# Patient Record
Sex: FEMALE | Race: ASIAN | NOT HISPANIC OR LATINO | URBAN - METROPOLITAN AREA
[De-identification: names, ages, dates, MRNs, and addresses within clinical notes are randomized per-mention and may not be internally consistent; named-entity substitution may affect disease eponyms.]

---

## 2020-06-02 ENCOUNTER — EMERGENCY (EMERGENCY)
Facility: HOSPITAL | Age: 75
LOS: 1 days | Discharge: HOME | End: 2020-06-02
Attending: EMERGENCY MEDICINE | Admitting: EMERGENCY MEDICINE
Payer: MEDICARE

## 2020-06-02 VITALS
OXYGEN SATURATION: 97 % | DIASTOLIC BLOOD PRESSURE: 80 MMHG | HEART RATE: 85 BPM | WEIGHT: 104.72 LBS | TEMPERATURE: 98 F | RESPIRATION RATE: 18 BRPM | SYSTOLIC BLOOD PRESSURE: 130 MMHG

## 2020-06-02 VITALS
HEART RATE: 85 BPM | DIASTOLIC BLOOD PRESSURE: 71 MMHG | SYSTOLIC BLOOD PRESSURE: 125 MMHG | RESPIRATION RATE: 17 BRPM | OXYGEN SATURATION: 97 % | TEMPERATURE: 98 F

## 2020-06-02 LAB
APPEARANCE UR: ABNORMAL
BILIRUB UR-MCNC: ABNORMAL
COD CRY URNS QL: ABNORMAL
COLOR SPEC: YELLOW — SIGNIFICANT CHANGE UP
COMMENT - URINE: SIGNIFICANT CHANGE UP
COMMENT - URINE: SIGNIFICANT CHANGE UP
DIFF PNL FLD: ABNORMAL
GLUCOSE UR QL: 50 MG/DL
KETONES UR-MCNC: ABNORMAL
LEUKOCYTE ESTERASE UR-ACNC: ABNORMAL
NITRITE UR-MCNC: NEGATIVE — SIGNIFICANT CHANGE UP
PH UR: 5 — SIGNIFICANT CHANGE UP (ref 5–8)
PROT UR-MCNC: 30 MG/DL
RBC CASTS # UR COMP ASSIST: SIGNIFICANT CHANGE UP /HPF (ref 0–4)
SP GR SPEC: 1.02 — SIGNIFICANT CHANGE UP (ref 1.01–1.02)
UROBILINOGEN FLD QL: 4

## 2020-06-02 PROCEDURE — 82962 GLUCOSE BLOOD TEST: CPT

## 2020-06-02 PROCEDURE — 81001 URINALYSIS AUTO W/SCOPE: CPT

## 2020-06-02 PROCEDURE — 87086 URINE CULTURE/COLONY COUNT: CPT

## 2020-06-02 PROCEDURE — 99283 EMERGENCY DEPT VISIT LOW MDM: CPT

## 2020-06-02 RX ORDER — CEFUROXIME AXETIL 250 MG
500 TABLET ORAL ONCE
Refills: 0 | Status: COMPLETED | OUTPATIENT
Start: 2020-06-02 | End: 2020-06-02

## 2020-06-02 RX ORDER — CEFUROXIME AXETIL 250 MG
1 TABLET ORAL
Qty: 14 | Refills: 0
Start: 2020-06-02 | End: 2020-06-08

## 2020-06-02 RX ADMIN — Medication 500 MILLIGRAM(S): at 12:56

## 2020-06-02 NOTE — ED ADULT NURSE NOTE - TOBACCO USE
Pt c/o feeling unbalanced since 2005 & losing weight. Pt c/o n/v/ with abd pain. Pt unsteady on feet. Never smoker

## 2020-06-02 NOTE — ED ADULT NURSE NOTE - CAS ELECT INFOMATION PROVIDED
DC instructions D/C given to pt w daughter on the phone to interpret the instructions.  Pt did not speak english/DC instructions/Other

## 2020-06-02 NOTE — ED PROVIDER NOTE - PROGRESS NOTE DETAILS
u/a + blood dehydrated advised fluids will cover with abx and f/u with urologist stable for d/c dw daughter as well

## 2020-06-02 NOTE — ED PROVIDER NOTE - OBJECTIVE STATEMENT
Pt is a 74 yo female Wolof speaking here for frequent urination for 2 weeks denies any pain dysuria fever chills abdominal pain back pain chest pain sob. Pt denies any covid contacts or covid symptoms.

## 2020-06-02 NOTE — ED PROVIDER NOTE - PATIENT PORTAL LINK FT
You can access the FollowMyHealth Patient Portal offered by [facility] by registering at the following website: [email or web address]. By joining [employer]’s FollowMyHealth portal, you will also be able to view your health information using other applications (apps) compatible with our system.

## 2020-06-02 NOTE — ED PROVIDER NOTE - CARE PROVIDER_API CALL
Dennis Valles  UROLOGY  [address] [place] [address]  Phone: [phone]  Fax: [phone]  Follow Up Time:

## 2020-06-02 NOTE — ED PROVIDER NOTE - ATTENDING CONTRIBUTION TO CARE
I have personally performed a face to face diagnostic evaluation on this patient.  I have reviewed the PA note and agree with the history, exam, and plan of care, except as noted.  History and Exam by me shows  urinary frequency for 2 weeks c no other complaint.  A n O x 3 .  abd- soft, nt, no guarding, rebound.  us shows mod ketone and blood and Caoxalate crystals.

## 2020-06-03 LAB
CULTURE RESULTS: SIGNIFICANT CHANGE UP
SPECIMEN SOURCE: SIGNIFICANT CHANGE UP

## 2020-06-29 ENCOUNTER — EMERGENCY (EMERGENCY)
Facility: HOSPITAL | Age: 75
LOS: 1 days | Discharge: HOME | End: 2020-06-29
Attending: EMERGENCY MEDICINE | Admitting: EMERGENCY MEDICINE
Payer: MEDICARE

## 2020-06-29 VITALS
SYSTOLIC BLOOD PRESSURE: 99 MMHG | DIASTOLIC BLOOD PRESSURE: 57 MMHG | HEART RATE: 67 BPM | RESPIRATION RATE: 16 BRPM | OXYGEN SATURATION: 97 % | TEMPERATURE: 98 F

## 2020-06-29 VITALS
RESPIRATION RATE: 16 BRPM | OXYGEN SATURATION: 96 % | DIASTOLIC BLOOD PRESSURE: 75 MMHG | SYSTOLIC BLOOD PRESSURE: 131 MMHG | HEART RATE: 82 BPM | TEMPERATURE: 99 F | WEIGHT: 110.01 LBS | HEIGHT: 63.78 IN

## 2020-06-29 DIAGNOSIS — F32.89 OTHER SPECIFIED DEPRESSIVE EPISODES: ICD-10-CM

## 2020-06-29 DIAGNOSIS — F41.9 ANXIETY DISORDER, UNSPECIFIED: ICD-10-CM

## 2020-06-29 PROBLEM — Z78.9 OTHER SPECIFIED HEALTH STATUS: Chronic | Status: ACTIVE | Noted: 2020-06-02

## 2020-06-29 LAB
ALBUMIN SERPL ELPH-MCNC: 3.7 G/DL — SIGNIFICANT CHANGE UP (ref 3.3–5)
ALP SERPL-CCNC: 49 U/L — SIGNIFICANT CHANGE UP (ref 40–120)
ALT FLD-CCNC: 38 U/L — SIGNIFICANT CHANGE UP (ref 12–78)
ANION GAP SERPL CALC-SCNC: 3 MMOL/L — LOW (ref 5–17)
APAP SERPL-MCNC: <2 UG/ML — LOW (ref 10–30)
APPEARANCE UR: CLEAR — SIGNIFICANT CHANGE UP
APTT BLD: 30.5 SEC — SIGNIFICANT CHANGE UP (ref 28.5–37)
AST SERPL-CCNC: 36 U/L — SIGNIFICANT CHANGE UP (ref 15–37)
BASOPHILS # BLD AUTO: 0.01 K/UL — SIGNIFICANT CHANGE UP (ref 0–0.2)
BASOPHILS NFR BLD AUTO: 0.3 % — SIGNIFICANT CHANGE UP (ref 0–2)
BILIRUB SERPL-MCNC: 1.3 MG/DL — HIGH (ref 0.2–1.2)
BILIRUB UR-MCNC: ABNORMAL
BUN SERPL-MCNC: 12 MG/DL — SIGNIFICANT CHANGE UP (ref 7–23)
CALCIUM SERPL-MCNC: 8.3 MG/DL — LOW (ref 8.5–10.1)
CHLORIDE SERPL-SCNC: 106 MMOL/L — SIGNIFICANT CHANGE UP (ref 96–108)
CO2 SERPL-SCNC: 29 MMOL/L — SIGNIFICANT CHANGE UP (ref 22–31)
COLOR SPEC: YELLOW — SIGNIFICANT CHANGE UP
CREAT SERPL-MCNC: 0.73 MG/DL — SIGNIFICANT CHANGE UP (ref 0.5–1.3)
DIFF PNL FLD: ABNORMAL
EOSINOPHIL # BLD AUTO: 0.01 K/UL — SIGNIFICANT CHANGE UP (ref 0–0.5)
EOSINOPHIL NFR BLD AUTO: 0.3 % — SIGNIFICANT CHANGE UP (ref 0–6)
ETHANOL SERPL-MCNC: <10 MG/DL — SIGNIFICANT CHANGE UP (ref 0–10)
GLUCOSE SERPL-MCNC: 106 MG/DL — HIGH (ref 70–99)
GLUCOSE UR QL: NEGATIVE — SIGNIFICANT CHANGE UP
HCT VFR BLD CALC: 35.1 % — SIGNIFICANT CHANGE UP (ref 34.5–45)
HGB BLD-MCNC: 11.9 G/DL — SIGNIFICANT CHANGE UP (ref 11.5–15.5)
IMM GRANULOCYTES NFR BLD AUTO: 0.3 % — SIGNIFICANT CHANGE UP (ref 0–1.5)
INR BLD: 0.98 RATIO — SIGNIFICANT CHANGE UP (ref 0.88–1.16)
KETONES UR-MCNC: ABNORMAL
LEUKOCYTE ESTERASE UR-ACNC: ABNORMAL
LYMPHOCYTES # BLD AUTO: 0.98 K/UL — LOW (ref 1–3.3)
LYMPHOCYTES # BLD AUTO: 26 % — SIGNIFICANT CHANGE UP (ref 13–44)
MCHC RBC-ENTMCNC: 33.3 PG — SIGNIFICANT CHANGE UP (ref 27–34)
MCHC RBC-ENTMCNC: 33.9 GM/DL — SIGNIFICANT CHANGE UP (ref 32–36)
MCV RBC AUTO: 98.3 FL — SIGNIFICANT CHANGE UP (ref 80–100)
MONOCYTES # BLD AUTO: 0.2 K/UL — SIGNIFICANT CHANGE UP (ref 0–0.9)
MONOCYTES NFR BLD AUTO: 5.3 % — SIGNIFICANT CHANGE UP (ref 2–14)
NEUTROPHILS # BLD AUTO: 2.56 K/UL — SIGNIFICANT CHANGE UP (ref 1.8–7.4)
NEUTROPHILS NFR BLD AUTO: 67.8 % — SIGNIFICANT CHANGE UP (ref 43–77)
NITRITE UR-MCNC: NEGATIVE — SIGNIFICANT CHANGE UP
NRBC # BLD: 0 /100 WBCS — SIGNIFICANT CHANGE UP (ref 0–0)
PCP SPEC-MCNC: SIGNIFICANT CHANGE UP
PH UR: 6 — SIGNIFICANT CHANGE UP (ref 5–8)
PLATELET # BLD AUTO: 164 K/UL — SIGNIFICANT CHANGE UP (ref 150–400)
POTASSIUM SERPL-MCNC: 4.2 MMOL/L — SIGNIFICANT CHANGE UP (ref 3.5–5.3)
POTASSIUM SERPL-SCNC: 4.2 MMOL/L — SIGNIFICANT CHANGE UP (ref 3.5–5.3)
PROT SERPL-MCNC: 6.4 G/DL — SIGNIFICANT CHANGE UP (ref 6–8.3)
PROT UR-MCNC: 15
PROTHROM AB SERPL-ACNC: 11.5 SEC — SIGNIFICANT CHANGE UP (ref 10.6–13.6)
RBC # BLD: 3.57 M/UL — LOW (ref 3.8–5.2)
RBC # FLD: 12.4 % — SIGNIFICANT CHANGE UP (ref 10.3–14.5)
SALICYLATES SERPL-MCNC: <1.7 MG/DL — LOW (ref 2.8–20)
SARS-COV-2 RNA SPEC QL NAA+PROBE: SIGNIFICANT CHANGE UP
SODIUM SERPL-SCNC: 138 MMOL/L — SIGNIFICANT CHANGE UP (ref 135–145)
SP GR SPEC: 1.02 — SIGNIFICANT CHANGE UP (ref 1.01–1.02)
UROBILINOGEN FLD QL: 1
WBC # BLD: 3.77 K/UL — LOW (ref 3.8–10.5)
WBC # FLD AUTO: 3.77 K/UL — LOW (ref 3.8–10.5)

## 2020-06-29 PROCEDURE — 93005 ELECTROCARDIOGRAM TRACING: CPT

## 2020-06-29 PROCEDURE — 87635 SARS-COV-2 COVID-19 AMP PRB: CPT

## 2020-06-29 PROCEDURE — 80053 COMPREHEN METABOLIC PANEL: CPT

## 2020-06-29 PROCEDURE — 99284 EMERGENCY DEPT VISIT MOD MDM: CPT

## 2020-06-29 PROCEDURE — 87086 URINE CULTURE/COLONY COUNT: CPT

## 2020-06-29 PROCEDURE — 36415 COLL VENOUS BLD VENIPUNCTURE: CPT

## 2020-06-29 PROCEDURE — 81001 URINALYSIS AUTO W/SCOPE: CPT

## 2020-06-29 PROCEDURE — 90792 PSYCH DIAG EVAL W/MED SRVCS: CPT | Mod: 95

## 2020-06-29 PROCEDURE — 99285 EMERGENCY DEPT VISIT HI MDM: CPT

## 2020-06-29 PROCEDURE — 93010 ELECTROCARDIOGRAM REPORT: CPT

## 2020-06-29 PROCEDURE — 85730 THROMBOPLASTIN TIME PARTIAL: CPT

## 2020-06-29 PROCEDURE — 85610 PROTHROMBIN TIME: CPT

## 2020-06-29 PROCEDURE — 85027 COMPLETE CBC AUTOMATED: CPT

## 2020-06-29 PROCEDURE — 80307 DRUG TEST PRSMV CHEM ANLYZR: CPT

## 2020-06-29 RX ORDER — NITROFURANTOIN MACROCRYSTAL 50 MG
1 CAPSULE ORAL
Qty: 10 | Refills: 0
Start: 2020-06-29 | End: 2020-07-03

## 2020-06-29 NOTE — ED PROVIDER NOTE - NS ED ROS FT
Constitutional: - Fever, - Chills, - Anorexia, - Fatigue, - Night sweats  Eyes: - Discharge, - Irritation, - Redness, - Visual changes, - Light sensitivity, - Pain  EARS: - Ear Pain, - Tinnitus, - Decreased hearing  NOSE: - Congestion, - Bloody nose  MOUTH/THROAT: - Vocal Changes, - Drooling, - Sore throat  NECK: - Lumps, - Stiffness, - Pain  CV: - Palpitations, - Chest Pain, - Edema, - Syncope  RESP:  - Cough, - Shortness of Breath, - Dyspnea on Exertion, - Trouble speaking, - Pleuritic pain - Wheezing  GI: - Diarrhea, - Constipation, - Bloody stools, - Nausea, - Vomiting, - Abdominal Pain  : - Dysuria, -Frequency, - Hematuria, - Hesitancy, + Incontinence, - Saddle Anesthesia, - Abnormal discharge  MSK: - Myalgias, - Arthralgias, - Weakness, - Deformities, - Injuries  SKIN: - Color change, - Rash, - Swelling, - Ecchymosis, - Abrasion, - laceration  NEURO: + Change in behavior, - Dec. Alertness, - Headache, - Dizziness, - Change in speech, - Weakness, - Seizure-like activity, - Difficulty ambulating

## 2020-06-29 NOTE — ED BEHAVIORAL HEALTH ASSESSMENT NOTE - RISK ASSESSMENT
denies suicidal ideation intent or plan. future oriented. good support system. in treatment    no chronic elevation, no prior suicide attempts or hospitalizations Low Acute Suicide Risk

## 2020-06-29 NOTE — ED PROVIDER NOTE - PROGRESS NOTE DETAILS
discussed case with psych Dr. Kothari, recommend d/c home f/u with outpatient psychiatrist, pt mentioned to telepsych that she was having urinary incontinence

## 2020-06-29 NOTE — ED BEHAVIORAL HEALTH ASSESSMENT NOTE - NS ED [facility] DEMOGRAPHICS MEDICAL RECORD REVIEWED YES RECORDS
Doxycycline hyclate not covered by pts insurance. Will send in doxycycline mono instead.    Hospital chart

## 2020-06-29 NOTE — ED PROVIDER NOTE - CARE PLAN
Principal Discharge DX:	[place] Principal Discharge DX:	Paranoia  Secondary Diagnosis:	Urinary incontinence, unspecified type

## 2020-06-29 NOTE — ED BEHAVIORAL HEALTH ASSESSMENT NOTE - HPI (INCLUDE ILLNESS QUALITY, SEVERITY, DURATION, TIMING, CONTEXT, MODIFYING FACTORS, ASSOCIATED SIGNS AND SYMPTOMS)
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  Patient is calm and cooperative, Telugu  used. she states she has had episodes of urinary incontinence recently that have made her "stressed out" and afraid to leave the house or socialize. she also has had anxiety about covid. she reports depressed and anxious mood, poor sleep (wakes up frequently) and decreased appetite with 10 lb weight loss. she attributes lack of appetite to worrying about having an accident with urination. she is quite Sikhism and has had the thought a few times of letting "god take me now," but she adamantly denies any suicidal ideation intent or plan. denies hopelessness but feels she is not happy because she worries about leaving the house. she saw a psychiatrist over zoom recently who prescribed her medications and she states she is taking them (can't name the psychiatrist or the meds). she is future oriented and contracts for safety and would like to return home. denies manic/psychotic sx's, denies substance abuse.   aaox4, cognition grossly intact   see  note for collateral

## 2020-06-29 NOTE — ED ADULT TRIAGE NOTE - CHIEF COMPLAINT QUOTE
Patient brought in by daughter for psychiatric eval. As per daughter the psychiatrist advised that patient be seen here. States she has been Manic and paranoid. Daughter states she has not been taking her medication.

## 2020-06-29 NOTE — ED ADULT NURSE NOTE - OBJECTIVE STATEMENT
74 y/o female brought in by daughter presents with complaints of anxiety and paranoia. As per Daughter the psychiatrist advised the patient be brought in. States she is not taking her medication. As per daughter the patient has been displaying behaviors of antoine and extreme anxiety/paranoia.

## 2020-06-29 NOTE — ED PROVIDER NOTE - PHYSICAL EXAMINATION
Gen: Alert, NAD  Head/eyes: NC/AT, PERRL  ENT: airway patent  Neck: supple, no tenderness/meningismus/JVD, Trachea midline  Pulm/lung: Bilateral clear BS, normal resp effort, no wheeze/stridor/retractions  CV/heart: RRR, no M/R/G, +2 dist pulses (radial, pedal DP/PT, popliteal)  GI/Abd: soft, NT/ND, +BS, no guarding/rebound tenderness  Musculoskeletal: no edema/erythema/cyanosis, FROM in all extremities, no C/T/L spine ttp  Skin: no rash, no vesicles, no petechaie, no ecchymosis, no swelling  Neuro: AAOx3, CN 2-12 intact, normal sensation, 5/5 motor strength in all extremities, normal gait

## 2020-06-29 NOTE — ED BEHAVIORAL HEALTH ASSESSMENT NOTE - SUMMARY
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  on evaluation, pt describes depression and anxiety surrounding episodes of urinary incontinence and covid fears. she has had decreased appetite and poor sleep, but denies suicidal ideation intent or plan or hopelessness. she is future oriented and states her Lutheran would never allow her to harm herself. she reports compliance with medications prescribed by psychiatrist. though daughter contests whether pt is compliant, she has no safety or suicidality concerns. pt is not an acute danger or in need of psychiatric hospitalization. she will be cleared and given outpatient resources as well as recommendation to f/u with current psychiatrist.

## 2020-06-29 NOTE — ED PROVIDER NOTE - NSFOLLOWUPINSTRUCTIONS_ED_ALL_ED_FT
1) Follow-up with your Primary Medical Doctor or referred doctor. Call today / next business day for prompt follow-up.  2) Return to Emergency room for any worsening or persistent pain, weakness, fever, or any other concerning symptoms.  3) See attached instruction sheets for additional information, including information regarding signs and symptoms to look out for, reasons to seek immediate care and other important instructions.  4) Take macrobid 100mg twice a day for 5 days.

## 2020-06-29 NOTE — ED BEHAVIORAL HEALTH ASSESSMENT NOTE - SUICIDE PROTECTIVE FACTORS
Identifies reasons for living/Has future plans/Supportive social network of family or friends/Responsibility to family and others/Fear of death or the actual act of killing self/Cultural, spiritual and/or moral attitudes against suicide

## 2020-06-29 NOTE — ED BEHAVIORAL HEALTH NOTE - BEHAVIORAL HEALTH NOTE
===================  PRE-HOSPITAL COURSE  ===================  SOURCE:  RN and triage documentation.   DETAILS:  Patient was brought in by family; chief complaint of manic state/paranoia and medication noncompliance.     ============  ED COURSE   ============  SOURCE: RN and triage documentation.    ARRIVAL:  Patient was clam and cooperative upon arrival and has allowed for gowning/security wanding without incident. Patient   BELONGINGS: None notable; items stowed with security.   BEHAVIOR: Patient has been calm and cooperative while in ED and has provided blood/urine for routine labs without incident. Patient presently endorsing SI with a plan to starve herself, endorses she is feeling very stressed out over COVID. Patient presently denying HI/AH/VH. Patient presents as intermittently tearful and has been resting in hospital bed. Patient's speech is of normal volume/rate accompanied by a logical and linear thought process; patient is AOx4. Patient makes appropriate eye contact. Patient has refused food but has drank water while in ED.   TREATMENT:  Patient has not required medication intervention while in ED.   VISITORS:  Patient is presently unaccompanied by social supports while in ED; daughter's contact information is in chart.     ========================  FOR EACH COLLATERAL  ========================  NAME: Sonia Szymanski   NUMBER: [phone]  RELATIONSHIP: Daughter  RELIABILITY: Reliable, brought patient to hospital today.   COMMENTS: Concerned for medication noncompliance and is looking for a different medication.     ========================  PATIENT DEMOGRAPHICS: Patient is a 74 y/o  female domiciled in private home, , unemployed, noncaregiver.   ========================  HPI  BASELINE FUNCTIONING: Patient at baseline is able to attend to ADL's without incident. Patient lives in private home with her  and is unemployed. Collateral reports normal eating/sleeping patterns that were routine and no changes in weight at baseline. Patient has been quarantining in her home since March due to COVID epidemic. Collateral endorses patient is not involved in any psychotherapy services.  DATE HPI STARTED: March.   DECOMPENSATION: Collateral states patient has been more anxious and is perseverating on having COVID 19, despite being tested for it and coming back negative. Patient has not wanted to leave home and has been locking herself in the house with ; reportedly has been sanitizing items in home and has not been allowing  to leave home. Patient reports she remained in the house and keeps windows closed and no AC. Collateral states she contacted a psychiatrist to spak to her mother in regards to the concern over COVID and hypervigilant behaviors. Collateral states patient has been medication noncompliant from medications prescribed from psychiatrist. Collateral states patient has not been eating much, has lost a lot of weight and has been experiencing insomnia. Patient asked daughter to take her to CPEP, instead took her to ER for evaluation. Collateral states she is worried for patient's  and his wellbeing in the house since patient hasn't been letting him out to go be with other family.   SUICIDALITY: Collateral denies SI/SIB/SA.   VIOLENCE:  AH/VH people outside watching her, putting flag on house to flag her as COVID patient.   SUBSTANCE:  Collateral denies.       ========================  PAST PSYCHIATRIC HISTORY  ========================  DATE PAST PSYCHIATRIC HISTORY STARTED: April 2020.   MAIN PSYCHIATRIC DIAGNOSIS: Paranoia, Anxiety  PSYCHIATRIC HOSPITALIZATIONS: Collateral denies.   PRIOR ILLNESS: Collateral denies.   SUICIDALITY:  Collateral denies SI/SIB/SA.   VIOLENCE:  Collateral denies HI/AH/VH or violence.   SUBSTANCE USE:  Collateral denies.     ==============  OTHER HISTORY  ==============  CURRENT MEDICATION:  Quetiapine 25mg, and Mirtazapine 15mg, has not been compliant with medications.   MEDICAL HISTORY:  Collateral denies; endorses the family was planning to take patient for dementia workup after COVID.   ALLERGIES: Seasonal allergies.   LEGAL ISSUES: Collateral denies.   FIREARM ACCESS: Collateral denies.   SOCIAL HISTORY: Collateral denies.   FAMILY HISTORY: Collateral denies.   DEVELOPMENTAL HISTORY: Collateral denies.

## 2020-06-29 NOTE — ED PROVIDER NOTE - PATIENT PORTAL LINK FT
You can access the FollowMyHealth Patient Portal offered by [facility] by registering at the following website: [email or web address]. By joining [employer]’s FollowMyHealth portal, you will also be able to view your health information using other applications (apps) compatible with our system.

## 2020-06-29 NOTE — ED ADULT NURSE REASSESSMENT NOTE - NS ED NURSE REASSESS COMMENT FT1
Patient resting comfortably. No complaints at this time. Constant observation continued. Patient given lunch tray, MD approved. PIV intact. Plan of care discussed with patient. Comfort and safety maintained. Will continue to monitor.

## 2020-06-30 LAB
CULTURE RESULTS: SIGNIFICANT CHANGE UP
SPECIMEN SOURCE: SIGNIFICANT CHANGE UP

## 2021-02-08 DIAGNOSIS — C79.9 SECONDARY MALIGNANT NEOPLASM OF UNSPECIFIED SITE: ICD-10-CM

## 2021-02-08 PROBLEM — Z00.00 ENCOUNTER FOR PREVENTIVE HEALTH EXAMINATION: Status: ACTIVE | Noted: 2021-02-08

## 2021-02-09 ENCOUNTER — APPOINTMENT (OUTPATIENT)
Dept: ORTHOPEDIC SURGERY | Facility: CLINIC | Age: 76
End: 2021-02-09

## 2022-08-08 NOTE — ED PROVIDER NOTE - OBJECTIVE STATEMENT
76 yo female hx of depression on remeron 15mg and seroquel 25mg BIB daughter translating in Swedish c/o patient is depressed, manic, and paranoid about covid 19.  Has not left apartment in 3 months. Daughter also states patient lacking appetite.  No SI/HI.  No visual/auditory hallucinations.  Patient otherwise has not other complaints.  As per daughter, patient has not been compliant with her psych medications. Universal Safety Interventions

## 2023-07-05 NOTE — ED ADULT NURSE NOTE - SUBSTANCE USE
It was good to meet you in clinic today. This is what we discussed:    Your lung function is normal.  Based on your upper endoscopy, you likely have some reflux causing the cough and phlegm.  Start omeprazole one pill daily.  I will see you in 3 months.  Contact me with questions or concerns.    Lucas Lopez MD  Pulmonary and Critical Care Medicine  [facility]  Office [phone]   None known

## 2023-09-19 NOTE — ED BEHAVIORAL HEALTH ASSESSMENT NOTE - INTERRUPTED ATTEMPT:
[facility]  Outpatient Physical Therapy   Treatment Note        Date: 2023  Patient: Yuliana Ross  : 1949   Confirmed: Yes  MRN: [id number]  Referring Provider: Corey Ly DO      Medical Diagnosis: Low back pain, unspecified back pain laterality, unspecified chronicity, unspecified whether sciatica present [M54.50]      Treatment Diagnosis: LBP, decreased strength, decreased mobility    Visit Information:  Insurance: Payor: Michelle Garcia / Plan: MEDICARE PART A AND B / Product Type: *No Product type* /   PT Visit Information  Onset Date: 23  PT Insurance Information: Medicare  Total # of Visits Approved:  (BMN)  Total # of Visits to Date: 8  Plan of Care/Certification Expiration Date: 23  No Show: 0  Progress Note Due Date: 10/19/23  Canceled Appointment: 0  Progress Note Counter:     Subjective Information:  Subjective: patient reports increased pain in the back  in the morning, however reports reduced as movement increases. notices that walking to [employer] and in store feel easier. However will continue to get pain in the anterior thigh at night.   HEP Compliance:  [x] Good [] Fair [] Poor [] Reports not doing due to:    Pain Screening  Patient Currently in Pain: Denies    Treatment:  Exercises:  Exercises  Exercise 1: STS from elevated plinth 2 sets x 10 reps,  Exercise 5: 4\" step-ups fwd/lat x10 reps, each, geraldine  Exercise 7: bridge 2 sets x 15 reps x 3-5\" holds x YTB above knee  Exercise 9: Scifit L2.5  x 5  Exercise 16: SLR x15 reps, geraldine ; S/L hip abd x15 reps, geraldine  Exercise 19: objective measures for PN  Exercise 20: HEP: cont current + S/L hip abd, STS, step ups    *Indicates exercise, modality, or manual techniques to be initiated when appropriate    Objective Measures:     Strength RLE  R Hip Flexion: 4-/5  R Hip Extension: 3-/5  R Hip ABduction: 4-/5  R Knee Flexion: 5/5  R Knee Extension: 5/5  R Ankle Dorsiflexion: 5/5  Strength LLE  L Hip Flexion: 3+/5  L Hip None known

## 2024-02-01 NOTE — ED ADULT TRIAGE NOTE - NS_BH TRG QUESTION1_ED_ALL_ED
Medicare Wellness Visit  Plan for Preventive Care    A good way for you to stay healthy is to use preventive care.  Medicare covers many services that can help you stay healthy.* The goal of these services is to find any health problems as quickly as possible. Finding problems early can help make them easier to treat.  Your personal plan below lists the services you may need and when they are due.      Health Maintenance Summary       Colorectal Cancer Risk - Colonoscopy (Every 5 Years)  Scheduled for 3/7/2024    Medicare Advantage- Medicare Wellness Visit (Yearly - January to December)  Due since 1/1/2024    Depression Screening (Yearly)  Next due on 1/28/2025    Breast Cancer Screening (Every 2 Years)  Scheduled for 4/26/2024    DTaP/Tdap/Td Vaccine (2 - Td or Tdap)  Next due on 6/19/2029    Hepatitis C Screening   Completed    Shingles Vaccine   Completed    Pneumococcal Vaccine 65+   Completed    Osteoporosis Screening   Completed    Influenza Vaccine   Completed    COVID-19 Vaccine   Completed    Meningococcal Vaccine   Aged Out    Hepatitis B Vaccine (For Physician/APC Discussion)   Aged Out    HPV Vaccine   Aged Out    Colorectal Cancer Screen-   Scheduled for 3/7/2024             Preventive Care for Women and Men    Heart Screenings (Cardiovascular):  Blood tests are used to check your cholesterol, lipid and triglyceride levels. High levels can increase your risk for heart disease and stroke. High levels can be treated with medications, diet and exercise. Lowering your levels can help keep your heart and blood vessels healthy.  Your provider will order these tests if they are needed.    An ultrasound is done to see if you have an abdominal aortic aneurysm (AAA).  This is an enlargement of one of the main blood vessels that delivers blood to the body.   In the [place], 9,000 deaths are caused by AAA.  You may not even know you have this problem and as many as 1 in 3 people will have a serious problem if  it is not treated.  Early diagnosis allows for more effective treatment and cure.  If you have a family history of AAA or are a male age 65-75 who has smoked, you are at higher risk of an AAA.  Your provider can order this test, if needed.    Colorectal Screening:  There are many tests that are used to check for cancer of your colon and rectum. You and your provider should discuss what test is best for you and when to have it done.  Options include:  Screening Colonoscopy: exam of the entire colon, seen through a flexible lighted tube.  Flexible Sigmoidoscopy: exam of the last third (sigmoid portion) of the colon and rectum, seen through a flexible lighted tube.  Cologuard DNA stool test: a sample of your stool is used to screen for cancer and unseen blood in your stool.  Fecal Occult Blood Test: a sample of your stool is studied to find any unseen blood    Flu Shot:  An immunization that helps to prevent influenza (the flu). You should get this every year. The best time to get the shot is in the fall.    Pneumococcal Shot:  Vaccines help prevent pneumococcal disease, which is any type of illness caused by Streptococcus pneumoniae bacteria. There are two kinds of pneumococcal vaccines available in the [place]:   Pneumococcal conjugate vaccines (PCV20 or Stezpil09®)  Pneumococcal polysaccharide vaccine (PPSV23 or Osbdrluyt40®)  For those who have never received any pneumococcal conjugate vaccine, CDC recommends PVC20 for adults 65 years or older and adults 19 through 64 years old with certain medical conditions or risk factors.   For those who have previously received PCV13, this should be followed by a dose of PPSV23.     Hepatitis B Shot:  An immunization that helps to protect people from getting Hepatitis B. Hepatitis B is a virus that spreads through contact with infected blood or body fluids. Many people with the virus do not have symptoms.  The virus can lead to serious problems, such as liver disease.  Some people are at higher risk than others. Your doctor will tell you if you need this shot.     Diabetes Screening:  A test to measure sugar (glucose) in your blood is called a fasting blood sugar. Fasting means you cannot have food or drink for at least 8 hours before the test. This test can detect diabetes long before you may notice symptoms.    Glaucoma Screening:  Glaucoma screening is performed by your eye doctor. The test measures the fluid pressure inside your eyes to determine if you have glaucoma.     Hepatitis C Screening:  A blood test to see if you have the hepatitis C virus.  Hepatitis C attacks the liver and is a major cause of chronic liver disease.  Medicare will cover a single screening for all adults born between 1945 & 1965, or high risk patients (people who have injected illegal drugs or people who have had blood transfusions).  High risk patients who continue to inject illegal drugs can be screened for Hepatitis C every year.    Smoking and Tobacco-Use Cessation Counseling:  Tobacco is the single greatest cause of disease and early death in our country today. Medication and counseling together can increase a person’s chance of quitting for good.   Medicare covers two quitting attempts per year, with four counseling sessions per attempt (eight sessions in a 12 month period)    Preventive Screening tests for Women    Screening Mammograms and Breast Exams:  An x-ray of your breasts to check for breast cancer before you or your doctor may be able to feel it.  If breast cancer is found early it can usually be treated with success.    Pelvic Exams and Pap Tests:  An exam to check for cervical and vaginal cancer. A Pap test is a lab test in which cells are taken from your cervix and sent to the lab to look for signs of cervical cancer. If cancer of the cervix is found early, chances for a cure are good. Testing can generally end at age 65, or if a woman has a hysterectomy for a benign condition. Your  provider may recommend more frequent testing if certain abnormal results are found.    Bone Mass Measurements:  A painless x-ray of your bone density to see if you are at risk for a broken bone. Bone density refers to the thickness of bones or how tightly the bone tissue is packed.    Preventive Screening tests for Men    Prostate Screening:  Should you have a prostate cancer test (PSA)?  It is up to you to decide if you want a prostate cancer test. Talk to your clinician to find out if the test is right for you.  Things for you to consider and talk about should include:  Benefits and harms of the test  Your family history  How your race/ethnicity may influence the test  If the test may impact other medical conditions you have  Your values on screenings and treatments    *Medicare pays for many preventive services to keep you healthy. For some of these services, you might have to pay a deductible, coinsurance, and / or copayment.  The amounts vary depending on the type of services you need and the kind of Medicare health plan you have.    For further details on screenings offered by Medicare please visit: https://www.medicare.gov/coverage/preventive-screening-services      No

## 2024-04-24 NOTE — ED BEHAVIORAL HEALTH ASSESSMENT NOTE - AXIS III
Follow up with your primary care doctor. Return for fever, discharge, worsening redness, worsening condition.     Rash, Adult    Heat rash on a person's hand.   A rash is a breakout of spots or blotches on the skin. It can affect the way the skin looks and feels. Many things can cause a rash. Common causes include:  Viral infections. These include colds, measles, and hand, foot, and mouth disease.  Bacterial infections. These include scarlet fever and impetigo.  Fungal infections. These include athlete's foot, ringworm, and yeast rashes.  Skin irritation. This may be from heat rash, exposure to moisture or friction for a long time (intertrigo), or exposure to soap or skin care products (eczema).  Allergic reactions. These may be caused by foods, medicines, or things like poison ivy.  Some rashes may go away after a few days. Others may last for a few weeks. The goal of treatment is to stop the itching and keep the rash from spreading.    Follow these instructions at home:  Medicine    A tube of ointment.  Take or apply over-the-counter and prescription medicines only as told by your health care provider. These may include:  Corticosteroids. These can help treat red or swollen skin. They may be given as creams or as medicines to take by mouth (orally).  Anti-itch lotions.  Allergy medicines.  Pain medicine.  Antifungal medicine if the rash is from a fungal infection.  Antibiotics if you have an infection.  Skin care    Apply cool, wet cloths (compresses) to the affected areas.  Do not scratch or rub your skin.  Avoid covering the rash. Keep it exposed to air as often as you can.  Managing itching and discomfort    Avoid hot showers and baths. These can make itching worse. A cold shower may help.  Try taking a bath with:  Epsom salts. You can get these at your local pharmacy or grocery store. Follow the instructions on the package.  Baking soda. Pour a small amount into the bath as told by your provider.  Colloidal oatmeal. You can get this at your local pharmacy or grocery store. Follow the instructions on the package.  Try putting baking soda paste on your skin. Stir water into baking soda until it becomes like a paste.  Try using calamine lotion or cortisone cream to help with itchiness.  Keep cool. Stay out of the sun. Sweating and being hot can make itching worse.  General instructions    A person writing in a journal.  Rest as needed.  Drink enough fluid to keep your pee (urine) pale yellow.  Wear loose-fitting clothes.  Avoid scented soaps, detergents, and perfumes. Use gentle soaps, detergents, perfumes, and cosmetics.  Avoid the things that cause your rash (triggers). Keep a journal to help keep track of your triggers. Write down:  What you eat.  What cosmetics you use.  What you drink.  What you wear. This includes jewelry.  Contact a health care provider if:  You sweat at night more than normal.  You pee (urinate) more or less than normal, or your pee is a darker color than normal.  Your eyes become sensitive to light.  Your skin or the white parts of your eyes turn yellow (jaundice).  Your skin tingles or is numb.  You get painful blisters in your nose or mouth.  Your rash does not go away after a few days, or it gets worse.  You are more tired or thirsty than normal.  You have new or worse symptoms. These may include:  Pain in your abdomen.  Fever.  Diarrhea or vomiting.  Weakness or weight loss.  Get help right away if:  You get confused.  You have a severe headache, a stiff neck, or severe joint pain or stiffness.  You become very sleepy or not responsive.  You have a seizure.  This information is not intended to replace advice given to you by your health care provider. Make sure you discuss any questions you have with your health care provider.    Document Revised: 10/06/2023 Document Reviewed: 10/06/2023  Elsevier Patient Education © 2024 [employer]
none

## 2024-11-26 NOTE — ED PROVIDER NOTE - NORMAL, MLM
OFFICE VISIT      CHIEF COMPLAINT    Patient presents with    HPI       Surgical Followup     Additional comments: Here today for 1 day post cataract surgery of the right eye and 1 week of the left eye.   Denies eye pain/discomfort.  Has some dust feeling.   Taking eye drops as prescribed.              Comments    Here today with son Cedric whom is also providing interpretation for today's visit                    HISTORY OF PRESENT ILLNESS    He is here today for postoperative exam after cataract removal and lens implantation in the right eye yesterday and the left eye 1 week ago.  Vision improving in both eyes.  He is using his medication as directed and tolerating it well.  He has occasional mild foreign body sensation which is resolved with artificial tears.  His son Cedric is present at his request and is his  at his request.    I have reviewed the past medical, family and social history sections including the medications and allergies listed in the above medical record as well as the nursing notes.     REVIEW OF SYSTEMS      Eye Problem(s):pseudophakia  ENT Problem(s):negative  Cardiovascular problem(s):negative  Respiratory problem(s):negative  Gastro-intestinal problem(s):negative GI  Genito-urinary problem(s):negative  Musculoskeletal problem(s):negative  Integumentary problem(s):negative  Neurological problem(s):negative  Psychiatric problem(s):negative  Endocrine problem(s):negative  Hematologic and/or Lymphatic problem(s):negative      PHYSICAL EXAM      Visual Acuity (Snellen- Linear)         Right Left    Dist sc 20/100 -2 20/100 +2            Main Ophthalmology Exam       External Exam         Right Left    External Normal Normal              Slit Lamp Exam         Right Left    Lids/Lashes Meibomian gland dysfunction, Telangiectasia, Madarosis Telangiectasia, Meibomian gland dysfunction, Madarosis    Conjunctiva/Sclera Cyst Cyst    Cornea Clear corneal incision, 2+ Edema Pterygium, Clear corneal  incision, 2+ Edema    Anterior Chamber Deep and quiet Deep and quiet    Iris Dilated Round and regular    Lens Posterior chamber intraocular lens, Centered posterior chamber intraocular lens Posterior chamber intraocular lens, Centered posterior chamber intraocular lens    Anterior Vitreous Clear Clear              Fundus Exam         Right Left    Disc Flat, pink with a healthy rim Flat, pink with a healthy rim    C/D Ratio 0.1 0.1    Macula Healthy with sharp foveal reflex Healthy with sharp foveal reflex    Vessels Healthy with normal Artery-Vein ratio Healthy with normal Artery-Vein ratio                      ASSESSMENT:    1. Follow-up examination after eye surgery    2. Bilateral pseudophakia      No orders of the defined types were placed in this encounter.       COMMENT:  BOTH EYES doing well postop cataract removal and lens implantation.  Surgery dates were right 11/25/2024 and left eye 11/18/2024.  We reviewed postoperative instructions with patient.  Patient voiced good understanding of our discussion.    PLAN:  Follow up exam in 1, week.    Please refer to full Ophthalmic Exam within the encounter for additional information.    The patient indicates understanding of these issues and agrees with the plan.            elieser all pertinent systems normal

## 2025-07-21 ENCOUNTER — EMERGENCY (EMERGENCY)
Facility: HOSPITAL | Age: 80
LOS: 1 days | End: 2025-07-21
Attending: STUDENT IN AN ORGANIZED HEALTH CARE EDUCATION/TRAINING PROGRAM | Admitting: STUDENT IN AN ORGANIZED HEALTH CARE EDUCATION/TRAINING PROGRAM
Payer: MEDICARE

## 2025-07-21 VITALS
HEART RATE: 88 BPM | SYSTOLIC BLOOD PRESSURE: 106 MMHG | TEMPERATURE: 98 F | DIASTOLIC BLOOD PRESSURE: 71 MMHG | HEIGHT: 64.17 IN | RESPIRATION RATE: 18 BRPM | WEIGHT: 110.01 LBS

## 2025-07-21 VITALS
HEART RATE: 58 BPM | SYSTOLIC BLOOD PRESSURE: 129 MMHG | OXYGEN SATURATION: 98 % | RESPIRATION RATE: 16 BRPM | DIASTOLIC BLOOD PRESSURE: 57 MMHG

## 2025-07-21 PROCEDURE — 99284 EMERGENCY DEPT VISIT MOD MDM: CPT | Mod: 25

## 2025-07-21 PROCEDURE — 70450 CT HEAD/BRAIN W/O DYE: CPT

## 2025-07-21 PROCEDURE — 70486 CT MAXILLOFACIAL W/O DYE: CPT

## 2025-07-21 PROCEDURE — 73080 X-RAY EXAM OF ELBOW: CPT | Mod: 26,RT

## 2025-07-21 PROCEDURE — 70450 CT HEAD/BRAIN W/O DYE: CPT | Mod: 26

## 2025-07-21 PROCEDURE — 73562 X-RAY EXAM OF KNEE 3: CPT | Mod: 26,RT

## 2025-07-21 PROCEDURE — 73080 X-RAY EXAM OF ELBOW: CPT

## 2025-07-21 PROCEDURE — 73562 X-RAY EXAM OF KNEE 3: CPT

## 2025-07-21 PROCEDURE — 72125 CT NECK SPINE W/O DYE: CPT

## 2025-07-21 PROCEDURE — 99284 EMERGENCY DEPT VISIT MOD MDM: CPT

## 2025-07-21 PROCEDURE — 72125 CT NECK SPINE W/O DYE: CPT | Mod: 26

## 2025-07-21 PROCEDURE — 70486 CT MAXILLOFACIAL W/O DYE: CPT | Mod: 26

## 2025-07-21 NOTE — ED PROVIDER NOTE - PHYSICAL EXAMINATION
Gen: Well appearing in NAD.   Eyes: PERRLA, EOMI, No visual complaints  Head: +Swelling with ecchymosis and bruising to the right maxilla, and periorbital region.   Heart: s1/s2, RRR  Lung: CTA b/l, no wheezing/rhonchi or rales.  Abd: soft, NT/ND,  Msk: No C-spine or mid spinal tenderness to palpation.  Full range of motion of shoulders bilaterally.  + Small ecchymosis to the right elbow, full range of motion of elbow.  No obvious deformity or tenderness to palpation.  Full range of motion of wrist bilaterally.  Pelvis stable.  Full range of motion of hips bilaterally.  Positive bruising to the right knee, full range of motion of knee no obvious deformity.  Patient ambulatory in the ED  Neuro: AAO x3, patient moving all extremity equally, no focal neuro deficits noted  Skin: see above   Psych: Calm and cooperative

## 2025-07-21 NOTE — ED ADULT NURSE NOTE - OBJECTIVE STATEMENT
Received the patient  in the Er. Patient is alert and oriented. S/P Tripped and fell over a  TV stand. Denies LOC. Patient is ambulatory. Able to move all extremities. + Swelling to right side face. + hematoma to right eye. + swelling to left wrist. Redness to right elbow. + Swelling and discoloration to right knee.

## 2025-07-21 NOTE — ED ADULT TRIAGE NOTE - CHIEF COMPLAINT QUOTE
pt c/o trip and fall over tv stand and hit her face on the floor around 4am today. bruising and severe swelling noted to R side of face. pt reports she also hit her R arm when she fell. denies LOC or use of blood thinners.

## 2025-07-21 NOTE — ED PROVIDER NOTE - PROGRESS NOTE DETAILS
RYLIE Alonso: Results reviewed at bedside.  No fractures noted.  X-ray images reviewed–no fractures noted.  Patient instructed to apply ice to the swelling on the face.  Return precautions given.  Will DC

## 2025-07-21 NOTE — ED PROVIDER NOTE - NSFOLLOWUPINSTRUCTIONS_ED_ALL_ED_FT
1. Take Tylenol 650mg every 4-6 hours as needed for pain. Apply Ice to the swollen areas as discussed  2. Follow up with your doctor in 1-2 days  3. Expect some nausea, vomiting, headache, dizziness and blurry vision  4. Return to the ED for persistent vomiting, weakness or numbness on one side of the body, loss of consciousness or any concerns.    Closed Head Injury    A closed head injury is an injury to your head that may or may not involve a traumatic brain injury (TBI). Symptoms of TBI can be short or long lasting and include headache, dizziness, interference with memory or speech, fatigue, confusion, changes in sleep, mood changes, nausea, depression/anxiety, and dulling of senses. Make sure to obtain proper rest which includes getting plenty of sleep, avoiding excessive visual stimulation, and avoiding activities that may cause physical or mental stress. Avoid any situation where there is potential for another head injury, including sports.    SEEK IMMEDIATE MEDICAL CARE IF YOU HAVE ANY OF THE FOLLOWING SYMPTOMS: unusual drowsiness, vomiting, severe dizziness, seizures, lightheadedness, muscular weakness, different pupil sizes, visual changes, or clear or bloody discharge from your ears or nose.

## 2025-07-21 NOTE — ED PROVIDER NOTE - CLINICAL SUMMARY MEDICAL DECISION MAKING FREE TEXT BOX
80-year-old female mechanical fall overnight with right facial swelling.  Differential inclusive of fracture, rule out intracranial hemorrhage.  Will get CTs.

## 2025-07-21 NOTE — ED ADULT NURSE NOTE - NSFALLRISKINTERV_ED_ALL_ED

## 2025-07-21 NOTE — ED PROVIDER NOTE - OBJECTIVE STATEMENT
pts daughter in law translated at bedside   80-year-old female history of hypotension and prediabetes not on any medications was brought in by her daughter-in-law for right facial bruising and swelling status post mechanical trip and fall at 4 AM last night.  Patient reports her foot got caught on the TV stand and she fell forward.  Patient reports she was able to get up herself and go back to bed.  When her daughter-in-law saw her face this morning she got concerned and brought her in for evaluation.  No reported use of anticoagulation.  Patient denies any headache dizziness, chest pain, shortness of breath, symptoms prior to the fall, paresthesias, neck pain or all other complaints

## 2025-07-21 NOTE — ED PROVIDER NOTE - PATIENT PORTAL LINK FT
You can access the FollowMyHealth Patient Portal offered by [facility] by registering at the following website: [email or web address]. By joining [employer]’s FollowMyHealth portal, you will also be able to view your health information using other applications (apps) compatible with our system.

## 2025-07-21 NOTE — ED PROVIDER NOTE - ATTENDING APP SHARED VISIT CONTRIBUTION OF CARE
This was a shared visit with RAMON. I reviewed and verified the documentation and independently performed the documented MDM. Patient seen and examined by myself. 80-year-old female mechanical fall overnight with right facial swelling.  Differential inclusive of fracture, rule out intracranial hemorrhage.  Will get CTs.